# Patient Record
Sex: MALE | Race: WHITE
[De-identification: names, ages, dates, MRNs, and addresses within clinical notes are randomized per-mention and may not be internally consistent; named-entity substitution may affect disease eponyms.]

---

## 2017-05-06 ENCOUNTER — HOSPITAL ENCOUNTER (EMERGENCY)
Dept: HOSPITAL 83 - ED | Age: 67
LOS: 1 days | Discharge: ANOTHER HOSPITAL | End: 2017-05-07
Payer: MEDICARE

## 2017-05-06 VITALS — HEIGHT: 71.97 IN | WEIGHT: 250 LBS | BODY MASS INDEX: 33.86 KG/M2

## 2017-05-06 VITALS — DIASTOLIC BLOOD PRESSURE: 88 MMHG
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
## 2017-05-06 DIAGNOSIS — Z88.0: ICD-10-CM
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
## 2017-05-06 DIAGNOSIS — I60.9: Primary | ICD-10-CM

## 2017-05-06 DIAGNOSIS — Z79.899: ICD-10-CM

## 2017-05-06 LAB
ALBUMIN SERPL-MCNC: NEGATIVE G/DL
AMPHETAMINES UR QL SCN: < 1000
APPEARANCE UR: CLEAR
BARBITURATES UR QL SCN: < 200
BASOPHILS # BLD AUTO: 0.1 10*3/UL (ref 0–0.1)
BASOPHILS NFR BLD AUTO: 0.7 % (ref 0–1)
BILIRUB UR QL STRIP: NEGATIVE
BUN SERPL-MCNC: 13 MG/DL (ref 7–24)
BZE UR QL SCN: < 300
CHLORIDE SERPL-SCNC: 106 MMOL/L (ref 98–107)
CO2 SERPL-SCNC: 25 MMOL/L (ref 21–32)
COLOR UR: YELLOW
EOSINOPHIL # BLD AUTO: 0.3 10*3/UL (ref 0–0.4)
EOSINOPHIL # BLD AUTO: 4.6 % (ref 1–4)
ERYTHROCYTE [DISTWIDTH] IN BLOOD BY AUTOMATED COUNT: 15.6 % (ref 0–14.5)
GLUCOSE SERPL-MCNC: 99 MG/DL (ref 65–99)
GLUCOSE UR QL: NEGATIVE
HCT VFR BLD AUTO: 46.6 % (ref 42–52)
HGB BLD-MCNC: 14.9 G/DL (ref 14–18)
HGB UR QL STRIP: (no result)
IG #: 0 10*3/UL (ref 0–0.1)
INR BLD: 1.1 (ref 2–3.5)
KETONES UR QL STRIP: NEGATIVE
LEUKOCYTE ESTERASE UR QL STRIP: NEGATIVE
LYMPHOCYTES # BLD AUTO: 2.6 10*3/UL (ref 1.3–4.4)
LYMPHOCYTES NFR BLD AUTO: 36.4 % (ref 27–41)
MAGNESIUM SERPL-MCNC: 2.6 MG/DL (ref 1.5–2.1)
MCH RBC QN AUTO: 29.3 PG (ref 27–31)
MCHC RBC AUTO-ENTMCNC: 32 G/DL (ref 33–37)
MCV RBC AUTO: 91.7 FL (ref 80–94)
MONOCYTES # BLD AUTO: 0.8 10*3/UL (ref 0.1–1)
MONOCYTES NFR BLD MANUAL: 11.4 % (ref 3–9)
NEUT #: 3.3 10*3/UL (ref 2.3–7.9)
NEUT %: 46.6 % (ref 47–73)
NITRITE UR QL STRIP: NEGATIVE
NRBC BLD QL AUTO: 0 % (ref 0–0)
PH UR STRIP: 7 [PH] (ref 5–9)
PLATELET # BLD AUTO: 153 10*3/UL (ref 130–400)
PMV BLD AUTO: 10.1 FL (ref 9.6–12.3)
POTASSIUM SERPL-SCNC: 4 MMOL/L (ref 3.5–5.1)
PROTHROMBIN TIME: 11.2 SECONDS (ref 9–12.4)
RBC # BLD AUTO: 5.08 10*6/UL (ref 4.5–5.9)
RBC #/AREA URNS HPF: (no result) RBC/HPF (ref 0–2)
SODIUM SERPL-SCNC: 143 MMOL/L (ref 136–145)
SP GR UR: <= 1.005 (ref 1–1.03)
TROPONIN I SERPL-MCNC: < 0.015 NG/ML (ref ?–0.04)
URINE REFLEX COMMENT: NO
UROBILINOGEN UR STRIP-MCNC: 0.2 E.U./DL (ref 0.2–1)
WBC #/AREA URNS HPF: (no result) WBC/HPF (ref 0–5)
WBC NRBC COR # BLD AUTO: 7 10*3/UL (ref 4.8–10.8)

## 2017-06-06 ENCOUNTER — HOSPITAL ENCOUNTER (INPATIENT)
Dept: HOSPITAL 83 - ED | Age: 67
LOS: 5 days | Discharge: OTHER INSTITUTION | DRG: 682 | End: 2017-06-11
Attending: INTERNAL MEDICINE | Admitting: INTERNAL MEDICINE
Payer: MEDICARE

## 2017-06-06 VITALS — DIASTOLIC BLOOD PRESSURE: 72 MMHG | SYSTOLIC BLOOD PRESSURE: 119 MMHG

## 2017-06-06 VITALS — DIASTOLIC BLOOD PRESSURE: 56 MMHG

## 2017-06-06 VITALS — DIASTOLIC BLOOD PRESSURE: 66 MMHG | SYSTOLIC BLOOD PRESSURE: 106 MMHG

## 2017-06-06 VITALS — BODY MASS INDEX: 28 KG/M2 | HEIGHT: 70.98 IN | WEIGHT: 200 LBS

## 2017-06-06 VITALS — SYSTOLIC BLOOD PRESSURE: 108 MMHG | DIASTOLIC BLOOD PRESSURE: 53 MMHG

## 2017-06-06 VITALS — DIASTOLIC BLOOD PRESSURE: 75 MMHG

## 2017-06-06 VITALS — SYSTOLIC BLOOD PRESSURE: 126 MMHG | DIASTOLIC BLOOD PRESSURE: 55 MMHG

## 2017-06-06 VITALS — SYSTOLIC BLOOD PRESSURE: 118 MMHG | DIASTOLIC BLOOD PRESSURE: 65 MMHG

## 2017-06-06 VITALS — DIASTOLIC BLOOD PRESSURE: 52 MMHG

## 2017-06-06 VITALS — DIASTOLIC BLOOD PRESSURE: 66 MMHG

## 2017-06-06 DIAGNOSIS — I10: ICD-10-CM

## 2017-06-06 DIAGNOSIS — G93.41: ICD-10-CM

## 2017-06-06 DIAGNOSIS — N17.0: Primary | ICD-10-CM

## 2017-06-06 DIAGNOSIS — R13.19: ICD-10-CM

## 2017-06-06 DIAGNOSIS — I95.9: ICD-10-CM

## 2017-06-06 DIAGNOSIS — K29.70: ICD-10-CM

## 2017-06-06 DIAGNOSIS — E44.0: ICD-10-CM

## 2017-06-06 DIAGNOSIS — E86.0: ICD-10-CM

## 2017-06-06 DIAGNOSIS — Z79.899: ICD-10-CM

## 2017-06-06 DIAGNOSIS — E87.6: ICD-10-CM

## 2017-06-06 DIAGNOSIS — Z86.73: ICD-10-CM

## 2017-06-06 DIAGNOSIS — Z88.0: ICD-10-CM

## 2017-06-06 DIAGNOSIS — Z79.1: ICD-10-CM

## 2017-06-06 DIAGNOSIS — Z22.322: ICD-10-CM

## 2017-06-06 DIAGNOSIS — E55.9: ICD-10-CM

## 2017-06-06 DIAGNOSIS — R74.0: ICD-10-CM

## 2017-06-06 DIAGNOSIS — E87.0: ICD-10-CM

## 2017-06-06 DIAGNOSIS — D53.9: ICD-10-CM

## 2017-06-06 LAB
ALBUMIN SERPL-MCNC: (no result) G/DL
ALBUMIN SERPL-MCNC: 3.2 GM/DL (ref 3.1–4.5)
ALP SERPL-CCNC: 116 U/L (ref 45–117)
ALT SERPL W P-5'-P-CCNC: 101 U/L (ref 12–78)
APPEARANCE UR: (no result)
AST SERPL-CCNC: 77 IU/L (ref 3–35)
BACTERIA #/AREA URNS HPF: (no result) /[HPF]
BASOPHILS # BLD AUTO: 0.1 10*3/UL (ref 0–0.1)
BASOPHILS NFR BLD AUTO: 0.7 % (ref 0–1)
BILIRUB UR QL STRIP: NEGATIVE
BUN SERPL-MCNC: 66 MG/DL (ref 7–24)
CASTS URNS QL MICRO: (no result)
CHLORIDE SERPL-SCNC: 117 MMOL/L (ref 98–107)
CK MB SERPL-MCNC: 0.8 NG/ML (ref 0.5–3.6)
CK MB SERPL-MCNC: 1.1 NG/ML (ref 0.5–3.6)
CK SERPL-CCNC: 192 U/L (ref 39–308)
CK SERPL-CCNC: 193 U/L (ref 39–308)
CO2 SERPL-SCNC: 27 MMOL/L (ref 21–32)
COLOR UR: YELLOW
CRP SERPL-MCNC: 1.02 MG/DL (ref 0–0.3)
EOSINOPHIL # BLD AUTO: 0.4 10*3/UL (ref 0–0.4)
EOSINOPHIL # BLD AUTO: 4.2 % (ref 1–4)
EPI CELLS #/AREA URNS HPF: (no result) /[HPF]
ERYTHROCYTE [DISTWIDTH] IN BLOOD BY AUTOMATED COUNT: 14.9 % (ref 0–14.5)
GLUCOSE SERPL-MCNC: 103 MG/DL (ref 65–99)
GLUCOSE UR QL: NEGATIVE
HCT VFR BLD AUTO: 38.6 % (ref 42–52)
HGB BLD-MCNC: 12.1 G/DL (ref 14–18)
HGB UR QL STRIP: NEGATIVE
IG #: 0 10*3/UL (ref 0–0.1)
INR BLD: 1.1 (ref 2–3.5)
KETONES UR QL STRIP: NEGATIVE
LEUKOCYTE ESTERASE UR QL STRIP: NEGATIVE
LYMPHOCYTES # BLD AUTO: 2.6 10*3/UL (ref 1.3–4.4)
LYMPHOCYTES NFR BLD AUTO: 29.3 % (ref 27–41)
MAGNESIUM SERPL-MCNC: 2.9 MG/DL (ref 1.5–2.1)
MCH RBC QN AUTO: 29.8 PG (ref 27–31)
MCHC RBC AUTO-ENTMCNC: 31.3 G/DL (ref 33–37)
MCV RBC AUTO: 95.1 FL (ref 80–94)
MONOCYTES # BLD AUTO: 0.7 10*3/UL (ref 0.1–1)
MONOCYTES NFR BLD MANUAL: 7.9 % (ref 3–9)
MUCOUS THREADS URNS QL MICRO: (no result)
MYOGLOBIN SERPL-MCNC: 325 NG/ML (ref 16–116)
NEUT #: 5.2 10*3/UL (ref 2.3–7.9)
NEUT %: 57.6 % (ref 47–73)
NITRITE UR QL STRIP: NEGATIVE
NRBC BLD QL AUTO: 0 % (ref 0–0)
PH UR STRIP: 5.5 [PH] (ref 5–9)
PLATELET # BLD AUTO: 183 10*3/UL (ref 130–400)
PMV BLD AUTO: 12.8 FL (ref 9.6–12.3)
POTASSIUM SERPL-SCNC: 4.3 MMOL/L (ref 3.5–5.1)
PROT SERPL-MCNC: 8.7 GM/DL (ref 6.4–8.2)
PROTHROMBIN TIME: 12 SECONDS (ref 9–12.4)
RBC # BLD AUTO: 4.06 10*6/UL (ref 4.5–5.9)
SODIUM SERPL-SCNC: 154 MMOL/L (ref 136–145)
SP GR UR: 1.02 (ref 1–1.03)
TROPONIN I SERPL-MCNC: < 0.015 NG/ML (ref ?–0.04)
TROPONIN I SERPL-MCNC: < 0.015 NG/ML (ref ?–0.04)
URINE REFLEX COMMENT: NO
UROBILINOGEN UR STRIP-MCNC: 0.2 E.U./DL (ref 0.2–1)
WBC #/AREA URNS HPF: (no result) WBC/HPF (ref 0–5)
WBC NRBC COR # BLD AUTO: 9 10*3/UL (ref 4.8–10.8)

## 2017-06-07 VITALS — SYSTOLIC BLOOD PRESSURE: 126 MMHG | DIASTOLIC BLOOD PRESSURE: 63 MMHG

## 2017-06-07 VITALS — DIASTOLIC BLOOD PRESSURE: 51 MMHG | SYSTOLIC BLOOD PRESSURE: 127 MMHG

## 2017-06-07 VITALS — DIASTOLIC BLOOD PRESSURE: 72 MMHG

## 2017-06-07 VITALS — DIASTOLIC BLOOD PRESSURE: 70 MMHG | SYSTOLIC BLOOD PRESSURE: 145 MMHG

## 2017-06-07 VITALS — DIASTOLIC BLOOD PRESSURE: 73 MMHG

## 2017-06-07 LAB
25(OH)D3 SERPL-MCNC: 22.5 NG/ML (ref 30–100)
ALBUMIN SERPL-MCNC: 3 GM/DL (ref 3.1–4.5)
ALP SERPL-CCNC: 103 U/L (ref 45–117)
ALT SERPL W P-5'-P-CCNC: 93 U/L (ref 12–78)
AST SERPL-CCNC: 72 IU/L (ref 3–35)
BASOPHILS # BLD AUTO: 0 10*3/UL (ref 0–0.1)
BASOPHILS NFR BLD AUTO: 0.4 % (ref 0–1)
BUN SERPL-MCNC: 43 MG/DL (ref 7–24)
CHLORIDE SERPL-SCNC: 111 MMOL/L (ref 98–107)
CHOLEST SERPL-MCNC: 105 MG/DL (ref ?–200)
CK MB SERPL-MCNC: 0.8 NG/ML (ref 0.5–3.6)
CK MB SERPL-MCNC: 1.1 NG/ML (ref 0.5–3.6)
CK SERPL-CCNC: 205 U/L (ref 39–308)
CK SERPL-CCNC: 207 U/L (ref 39–308)
CO2 SERPL-SCNC: 28 MMOL/L (ref 21–32)
EOSINOPHIL # BLD AUTO: 0.4 10*3/UL (ref 0–0.4)
EOSINOPHIL # BLD AUTO: 4.6 % (ref 1–4)
ERYTHROCYTE [DISTWIDTH] IN BLOOD BY AUTOMATED COUNT: 14.6 % (ref 0–14.5)
FOLATE SERPL-MCNC: 18.39 NG/ML (ref 5.38–?)
GLUCOSE SERPL-MCNC: 105 MG/DL (ref 65–99)
HCT VFR BLD AUTO: 35.3 % (ref 42–52)
HDLC SERPL-MCNC: 31 MG/DL (ref 40–60)
HGB BLD-MCNC: 10.9 G/DL (ref 14–18)
IG #: 0 10*3/UL (ref 0–0.1)
INR BLD: 1.1 (ref 2–3.5)
LDLC SERPL DIRECT ASSAY-MCNC: 55 MG/DL (ref 9–159)
LYMPHOCYTES # BLD AUTO: 2.4 10*3/UL (ref 1.3–4.4)
LYMPHOCYTES NFR BLD AUTO: 28.7 % (ref 27–41)
MAGNESIUM SERPL-MCNC: 2.5 MG/DL (ref 1.5–2.1)
MCH RBC QN AUTO: 29.1 PG (ref 27–31)
MCHC RBC AUTO-ENTMCNC: 30.9 G/DL (ref 33–37)
MCV RBC AUTO: 94.4 FL (ref 80–94)
MONOCYTES # BLD AUTO: 0.8 10*3/UL (ref 0.1–1)
MONOCYTES NFR BLD MANUAL: 9.4 % (ref 3–9)
NEUT #: 4.8 10*3/UL (ref 2.3–7.9)
NEUT %: 56.7 % (ref 47–73)
NRBC BLD QL AUTO: 0 % (ref 0–0)
PHOSPHATE SERPL-MCNC: 3.1 MG/DL (ref 2.5–4.9)
PLATELET # BLD AUTO: 155 10*3/UL (ref 130–400)
PMV BLD AUTO: 12.2 FL (ref 9.6–12.3)
POTASSIUM SERPL-SCNC: 3.6 MMOL/L (ref 3.5–5.1)
PROT SERPL-MCNC: 8 GM/DL (ref 6.4–8.2)
PROTHROMBIN TIME: 12.2 SECONDS (ref 9–12.4)
RBC # BLD AUTO: 3.74 10*6/UL (ref 4.5–5.9)
SODIUM SERPL-SCNC: 148 MMOL/L (ref 136–145)
T4 FREE SERPL-MCNC: 1.2 NG/DL (ref 0.76–1.46)
TRIGL SERPL-MCNC: 94 MG/DL (ref ?–150)
TROPONIN I SERPL-MCNC: < 0.015 NG/ML (ref ?–0.04)
TROPONIN I SERPL-MCNC: < 0.015 NG/ML (ref ?–0.04)
TSH SERPL DL<=0.005 MIU/L-ACNC: 2.47 UIU/ML (ref 0.36–4.75)
VITAMIN B12: 773 PG/ML (ref 247–911)
VLDLC SERPL CALC-MCNC: 19 MG/DL (ref 6–40)
WBC NRBC COR # BLD AUTO: 8.4 10*3/UL (ref 4.8–10.8)

## 2017-06-08 VITALS — SYSTOLIC BLOOD PRESSURE: 138 MMHG | DIASTOLIC BLOOD PRESSURE: 74 MMHG

## 2017-06-08 VITALS — DIASTOLIC BLOOD PRESSURE: 72 MMHG

## 2017-06-08 VITALS — DIASTOLIC BLOOD PRESSURE: 68 MMHG

## 2017-06-08 VITALS — DIASTOLIC BLOOD PRESSURE: 69 MMHG

## 2017-06-08 LAB
ALBUMIN SERPL-MCNC: 2.9 GM/DL (ref 3.1–4.5)
ALP SERPL-CCNC: 106 U/L (ref 45–117)
ALT SERPL W P-5'-P-CCNC: 89 U/L (ref 12–78)
AST SERPL-CCNC: 75 IU/L (ref 3–35)
BASOPHILS # BLD AUTO: 0 10*3/UL (ref 0–0.1)
BASOPHILS NFR BLD AUTO: 0.3 % (ref 0–1)
BUN SERPL-MCNC: 26 MG/DL (ref 7–24)
CHLORIDE SERPL-SCNC: 105 MMOL/L (ref 98–107)
CO2 SERPL-SCNC: 27 MMOL/L (ref 21–32)
EOSINOPHIL # BLD AUTO: 0.3 10*3/UL (ref 0–0.4)
EOSINOPHIL # BLD AUTO: 3.6 % (ref 1–4)
ERYTHROCYTE [DISTWIDTH] IN BLOOD BY AUTOMATED COUNT: 14.2 % (ref 0–14.5)
GLUCOSE SERPL-MCNC: 98 MG/DL (ref 65–99)
HCT VFR BLD AUTO: 35.4 % (ref 42–52)
HGB BLD-MCNC: 11.1 G/DL (ref 14–18)
IG #: 0 10*3/UL (ref 0–0.1)
LYMPHOCYTES # BLD AUTO: 2.5 10*3/UL (ref 1.3–4.4)
LYMPHOCYTES NFR BLD AUTO: 32.1 % (ref 27–41)
MCH RBC QN AUTO: 29.1 PG (ref 27–31)
MCHC RBC AUTO-ENTMCNC: 31.4 G/DL (ref 33–37)
MCV RBC AUTO: 92.7 FL (ref 80–94)
MONOCYTES # BLD AUTO: 0.8 10*3/UL (ref 0.1–1)
MONOCYTES NFR BLD MANUAL: 9.8 % (ref 3–9)
NEUT #: 4.2 10*3/UL (ref 2.3–7.9)
NEUT %: 53.8 % (ref 47–73)
NRBC BLD QL AUTO: 0 % (ref 0–0)
PLATELET # BLD AUTO: 138 10*3/UL (ref 130–400)
PMV BLD AUTO: 12.5 FL (ref 9.6–12.3)
POTASSIUM SERPL-SCNC: 3.4 MMOL/L (ref 3.5–5.1)
PROT SERPL-MCNC: 7.9 GM/DL (ref 6.4–8.2)
RBC # BLD AUTO: 3.82 10*6/UL (ref 4.5–5.9)
SODIUM SERPL-SCNC: 140 MMOL/L (ref 136–145)
WBC NRBC COR # BLD AUTO: 7.9 10*3/UL (ref 4.8–10.8)

## 2017-06-09 VITALS — DIASTOLIC BLOOD PRESSURE: 61 MMHG | SYSTOLIC BLOOD PRESSURE: 129 MMHG

## 2017-06-09 VITALS — DIASTOLIC BLOOD PRESSURE: 82 MMHG | SYSTOLIC BLOOD PRESSURE: 158 MMHG

## 2017-06-09 VITALS — DIASTOLIC BLOOD PRESSURE: 87 MMHG | SYSTOLIC BLOOD PRESSURE: 128 MMHG

## 2017-06-09 VITALS — DIASTOLIC BLOOD PRESSURE: 81 MMHG

## 2017-06-09 VITALS — SYSTOLIC BLOOD PRESSURE: 161 MMHG | DIASTOLIC BLOOD PRESSURE: 71 MMHG

## 2017-06-09 VITALS — DIASTOLIC BLOOD PRESSURE: 78 MMHG

## 2017-06-09 VITALS — SYSTOLIC BLOOD PRESSURE: 154 MMHG | DIASTOLIC BLOOD PRESSURE: 70 MMHG

## 2017-06-09 VITALS — DIASTOLIC BLOOD PRESSURE: 71 MMHG

## 2017-06-09 LAB
ALBUMIN SERPL-MCNC: 3 GM/DL (ref 3.1–4.5)
ALP SERPL-CCNC: 106 U/L (ref 45–117)
ALT SERPL W P-5'-P-CCNC: 95 U/L (ref 12–78)
AST SERPL-CCNC: 79 IU/L (ref 3–35)
BASOPHILS # BLD AUTO: 0 10*3/UL (ref 0–0.1)
BASOPHILS NFR BLD AUTO: 0.3 % (ref 0–1)
BUN SERPL-MCNC: 12 MG/DL (ref 7–24)
CHLORIDE SERPL-SCNC: 102 MMOL/L (ref 98–107)
CO2 SERPL-SCNC: 28 MMOL/L (ref 21–32)
EOSINOPHIL # BLD AUTO: 0.2 10*3/UL (ref 0–0.4)
EOSINOPHIL # BLD AUTO: 3.1 % (ref 1–4)
ERYTHROCYTE [DISTWIDTH] IN BLOOD BY AUTOMATED COUNT: 13.8 % (ref 0–14.5)
GLUCOSE SERPL-MCNC: 93 MG/DL (ref 65–99)
HCT VFR BLD AUTO: 34.8 % (ref 42–52)
HGB BLD-MCNC: 11.3 G/DL (ref 14–18)
IG #: 0 10*3/UL (ref 0–0.1)
LYMPHOCYTES # BLD AUTO: 1.9 10*3/UL (ref 1.3–4.4)
LYMPHOCYTES NFR BLD AUTO: 25.3 % (ref 27–41)
MCH RBC QN AUTO: 30.3 PG (ref 27–31)
MCHC RBC AUTO-ENTMCNC: 32.5 G/DL (ref 33–37)
MCV RBC AUTO: 93.3 FL (ref 80–94)
MONOCYTES # BLD AUTO: 0.9 10*3/UL (ref 0.1–1)
MONOCYTES NFR BLD MANUAL: 11.8 % (ref 3–9)
NEUT #: 4.4 10*3/UL (ref 2.3–7.9)
NEUT %: 59.4 % (ref 47–73)
NRBC BLD QL AUTO: 0 10*3/UL (ref 0–0)
PLATELET # BLD AUTO: 142 10*3/UL (ref 130–400)
PMV BLD AUTO: 12.4 FL (ref 9.6–12.3)
POTASSIUM SERPL-SCNC: 3.4 MMOL/L (ref 3.5–5.1)
PROT SERPL-MCNC: 8 GM/DL (ref 6.4–8.2)
RBC # BLD AUTO: 3.73 10*6/UL (ref 4.5–5.9)
SODIUM SERPL-SCNC: 138 MMOL/L (ref 136–145)
WBC NRBC COR # BLD AUTO: 7.4 10*3/UL (ref 4.8–10.8)

## 2017-06-09 PROCEDURE — 0DH63UZ INSERTION OF FEEDING DEVICE INTO STOMACH, PERCUTANEOUS APPROACH: ICD-10-PCS | Performed by: SURGERY

## 2017-06-10 VITALS — SYSTOLIC BLOOD PRESSURE: 131 MMHG | DIASTOLIC BLOOD PRESSURE: 55 MMHG

## 2017-06-10 VITALS — SYSTOLIC BLOOD PRESSURE: 127 MMHG | DIASTOLIC BLOOD PRESSURE: 56 MMHG

## 2017-06-10 VITALS — SYSTOLIC BLOOD PRESSURE: 152 MMHG | DIASTOLIC BLOOD PRESSURE: 77 MMHG

## 2017-06-10 VITALS — SYSTOLIC BLOOD PRESSURE: 89 MMHG | DIASTOLIC BLOOD PRESSURE: 49 MMHG

## 2017-06-10 VITALS — DIASTOLIC BLOOD PRESSURE: 58 MMHG

## 2017-06-10 VITALS — DIASTOLIC BLOOD PRESSURE: 78 MMHG

## 2017-06-10 VITALS — DIASTOLIC BLOOD PRESSURE: 50 MMHG

## 2017-06-10 LAB
ALBUMIN SERPL-MCNC: 3 GM/DL (ref 3.1–4.5)
ALP SERPL-CCNC: 116 U/L (ref 45–117)
ALT SERPL W P-5'-P-CCNC: 88 U/L (ref 12–78)
AST SERPL-CCNC: 69 IU/L (ref 3–35)
BASOPHILS # BLD AUTO: 0 10*3/UL (ref 0–0.1)
BASOPHILS NFR BLD AUTO: 0.2 % (ref 0–1)
BUN SERPL-MCNC: 8 MG/DL (ref 7–24)
CHLORIDE SERPL-SCNC: 102 MMOL/L (ref 98–107)
CO2 SERPL-SCNC: 24 MMOL/L (ref 21–32)
EOSINOPHIL # BLD AUTO: 0.1 10*3/UL (ref 0–0.4)
EOSINOPHIL # BLD AUTO: 1.4 % (ref 1–4)
ERYTHROCYTE [DISTWIDTH] IN BLOOD BY AUTOMATED COUNT: 14 % (ref 0–14.5)
GLUCOSE SERPL-MCNC: 133 MG/DL (ref 65–99)
HCT VFR BLD AUTO: 35.6 % (ref 42–52)
HGB BLD-MCNC: 11.7 G/DL (ref 14–18)
IG #: 0 10*3/UL (ref 0–0.1)
LYMPHOCYTES # BLD AUTO: 1.9 10*3/UL (ref 1.3–4.4)
LYMPHOCYTES NFR BLD AUTO: 20.4 % (ref 27–41)
MAGNESIUM SERPL-MCNC: 1.8 MG/DL (ref 1.5–2.1)
MCH RBC QN AUTO: 29.9 PG (ref 27–31)
MCHC RBC AUTO-ENTMCNC: 32.9 G/DL (ref 33–37)
MCV RBC AUTO: 91 FL (ref 80–94)
MONOCYTES # BLD AUTO: 1.1 10*3/UL (ref 0.1–1)
MONOCYTES NFR BLD MANUAL: 11.6 % (ref 3–9)
NEUT #: 6 10*3/UL (ref 2.3–7.9)
NEUT %: 66.2 % (ref 47–73)
NRBC BLD QL AUTO: 0 % (ref 0–0)
PHOSPHATE SERPL-MCNC: 2.4 MG/DL (ref 2.5–4.9)
PLATELET # BLD AUTO: 142 10*3/UL (ref 130–400)
PMV BLD AUTO: 12 FL (ref 9.6–12.3)
POTASSIUM SERPL-SCNC: 3.3 MMOL/L (ref 3.5–5.1)
PROT SERPL-MCNC: 8 GM/DL (ref 6.4–8.2)
RBC # BLD AUTO: 3.91 10*6/UL (ref 4.5–5.9)
SODIUM SERPL-SCNC: 136 MMOL/L (ref 136–145)
WBC NRBC COR # BLD AUTO: 9.1 10*3/UL (ref 4.8–10.8)

## 2017-06-11 VITALS — DIASTOLIC BLOOD PRESSURE: 63 MMHG

## 2017-06-11 VITALS — DIASTOLIC BLOOD PRESSURE: 67 MMHG | SYSTOLIC BLOOD PRESSURE: 140 MMHG

## 2017-06-11 VITALS — DIASTOLIC BLOOD PRESSURE: 67 MMHG

## 2017-06-11 LAB
ALBUMIN SERPL-MCNC: 2.8 GM/DL (ref 3.1–4.5)
ALP SERPL-CCNC: 115 U/L (ref 45–117)
ALT SERPL W P-5'-P-CCNC: 74 U/L (ref 12–78)
AST SERPL-CCNC: 55 IU/L (ref 3–35)
BUN SERPL-MCNC: 9 MG/DL (ref 7–24)
CHLORIDE SERPL-SCNC: 105 MMOL/L (ref 98–107)
CO2 SERPL-SCNC: 25 MMOL/L (ref 21–32)
GLUCOSE SERPL-MCNC: 119 MG/DL (ref 65–99)
MAGNESIUM SERPL-MCNC: 1.8 MG/DL (ref 1.5–2.1)
PHOSPHATE SERPL-MCNC: 2.5 MG/DL (ref 2.5–4.9)
POTASSIUM SERPL-SCNC: 3.8 MMOL/L (ref 3.5–5.1)
PROT SERPL-MCNC: 7.5 GM/DL (ref 6.4–8.2)
SODIUM SERPL-SCNC: 139 MMOL/L (ref 136–145)

## 2017-06-24 ENCOUNTER — HOSPITAL ENCOUNTER (INPATIENT)
Dept: HOSPITAL 83 - ED | Age: 67
LOS: 3 days | Discharge: OTHER INSTITUTION | DRG: 871 | End: 2017-06-27
Attending: INTERNAL MEDICINE | Admitting: INTERNAL MEDICINE
Payer: MEDICARE

## 2017-06-24 VITALS — SYSTOLIC BLOOD PRESSURE: 109 MMHG | DIASTOLIC BLOOD PRESSURE: 57 MMHG

## 2017-06-24 VITALS
HEIGHT: 70 IN | DIASTOLIC BLOOD PRESSURE: 59 MMHG | BODY MASS INDEX: 29.92 KG/M2 | SYSTOLIC BLOOD PRESSURE: 120 MMHG | WEIGHT: 209 LBS

## 2017-06-24 VITALS — DIASTOLIC BLOOD PRESSURE: 73 MMHG | SYSTOLIC BLOOD PRESSURE: 127 MMHG

## 2017-06-24 DIAGNOSIS — E86.0: ICD-10-CM

## 2017-06-24 DIAGNOSIS — I11.9: ICD-10-CM

## 2017-06-24 DIAGNOSIS — E55.9: ICD-10-CM

## 2017-06-24 DIAGNOSIS — Z86.14: ICD-10-CM

## 2017-06-24 DIAGNOSIS — Z79.899: ICD-10-CM

## 2017-06-24 DIAGNOSIS — D69.6: ICD-10-CM

## 2017-06-24 DIAGNOSIS — Z86.73: ICD-10-CM

## 2017-06-24 DIAGNOSIS — R13.19: ICD-10-CM

## 2017-06-24 DIAGNOSIS — D64.9: ICD-10-CM

## 2017-06-24 DIAGNOSIS — E87.8: ICD-10-CM

## 2017-06-24 DIAGNOSIS — Z79.1: ICD-10-CM

## 2017-06-24 DIAGNOSIS — K94.22: ICD-10-CM

## 2017-06-24 DIAGNOSIS — G93.41: ICD-10-CM

## 2017-06-24 DIAGNOSIS — Z88.0: ICD-10-CM

## 2017-06-24 DIAGNOSIS — E43: ICD-10-CM

## 2017-06-24 DIAGNOSIS — Z87.891: ICD-10-CM

## 2017-06-24 DIAGNOSIS — A41.9: Primary | ICD-10-CM

## 2017-06-24 LAB
ALBUMIN SERPL-MCNC: (no result) G/DL
ALBUMIN SERPL-MCNC: 3.1 GM/DL (ref 3.1–4.5)
ALP SERPL-CCNC: 80 U/L (ref 45–117)
ALT SERPL W P-5'-P-CCNC: 39 U/L (ref 12–78)
APPEARANCE UR: (no result)
AST SERPL-CCNC: 31 IU/L (ref 3–35)
BACTERIA #/AREA URNS HPF: (no result) /[HPF]
BASOPHILS # BLD AUTO: 0 10*3/UL (ref 0–0.1)
BASOPHILS NFR BLD AUTO: 0.3 % (ref 0–1)
BILIRUB UR QL STRIP: (no result)
BUN SERPL-MCNC: 18 MG/DL (ref 7–24)
CHLORIDE SERPL-SCNC: 110 MMOL/L (ref 98–107)
CO2 SERPL-SCNC: 24 MMOL/L (ref 21–32)
COLOR UR: YELLOW
EOSINOPHIL # BLD AUTO: 0.1 10*3/UL (ref 0–0.4)
EOSINOPHIL # BLD AUTO: 0.5 % (ref 1–4)
EPI CELLS #/AREA URNS HPF: (no result) /[HPF]
ERYTHROCYTE [DISTWIDTH] IN BLOOD BY AUTOMATED COUNT: 15.1 % (ref 0–14.5)
GLUCOSE SERPL-MCNC: 108 MG/DL (ref 65–99)
GLUCOSE UR QL: NEGATIVE
HCT VFR BLD AUTO: 32.8 % (ref 42–52)
HGB BLD-MCNC: 10.5 G/DL (ref 14–18)
HGB UR QL STRIP: (no result)
IG #: 0 10*3/UL (ref 0–0.1)
KETONES UR QL STRIP: (no result)
LEUKOCYTE ESTERASE UR QL STRIP: NEGATIVE
LYMPHOCYTES # BLD AUTO: 1.9 10*3/UL (ref 1.3–4.4)
LYMPHOCYTES NFR BLD AUTO: 17.6 % (ref 27–41)
MCH RBC QN AUTO: 29.6 PG (ref 27–31)
MCHC RBC AUTO-ENTMCNC: 32 G/DL (ref 33–37)
MCV RBC AUTO: 92.4 FL (ref 80–94)
MONOCYTES # BLD AUTO: 1.3 10*3/UL (ref 0.1–1)
MONOCYTES NFR BLD MANUAL: 12.4 % (ref 3–9)
MUCOUS THREADS URNS QL MICRO: (no result)
NEUT #: 7.3 10*3/UL (ref 2.3–7.9)
NEUT %: 68.8 % (ref 47–73)
NITRITE UR QL STRIP: NEGATIVE
NRBC BLD QL AUTO: 0 10*3/UL (ref 0–0)
PH UR STRIP: 5.5 [PH] (ref 5–9)
PLATELET # BLD AUTO: 155 10*3/UL (ref 130–400)
PMV BLD AUTO: 11 FL (ref 9.6–12.3)
POTASSIUM SERPL-SCNC: 4.1 MMOL/L (ref 3.5–5.1)
PROT SERPL-MCNC: 7.7 GM/DL (ref 6.4–8.2)
RBC # BLD AUTO: 3.55 10*6/UL (ref 4.5–5.9)
RBC #/AREA URNS HPF: (no result) RBC/HPF (ref 0–2)
SODIUM SERPL-SCNC: 144 MMOL/L (ref 136–145)
SP GR UR: 1.01 (ref 1–1.03)
URINE REFLEX COMMENT: YES
UROBILINOGEN UR STRIP-MCNC: 1 E.U./DL (ref 0.2–1)
WBC #/AREA URNS HPF: (no result) WBC/HPF (ref 0–5)
WBC NRBC COR # BLD AUTO: 10.5 10*3/UL (ref 4.8–10.8)

## 2017-06-25 VITALS — DIASTOLIC BLOOD PRESSURE: 65 MMHG

## 2017-06-25 VITALS — SYSTOLIC BLOOD PRESSURE: 121 MMHG | DIASTOLIC BLOOD PRESSURE: 67 MMHG

## 2017-06-25 VITALS — SYSTOLIC BLOOD PRESSURE: 156 MMHG | DIASTOLIC BLOOD PRESSURE: 68 MMHG

## 2017-06-25 VITALS — DIASTOLIC BLOOD PRESSURE: 58 MMHG | SYSTOLIC BLOOD PRESSURE: 131 MMHG

## 2017-06-25 VITALS — DIASTOLIC BLOOD PRESSURE: 63 MMHG | SYSTOLIC BLOOD PRESSURE: 153 MMHG

## 2017-06-25 LAB
25(OH)D3 SERPL-MCNC: 27.3 NG/ML (ref 30–100)
ALBUMIN SERPL-MCNC: 2.5 GM/DL (ref 3.1–4.5)
ALP SERPL-CCNC: 66 U/L (ref 45–117)
ALT SERPL W P-5'-P-CCNC: 29 U/L (ref 12–78)
AST SERPL-CCNC: 27 IU/L (ref 3–35)
BASOPHILS # BLD AUTO: 0 10*3/UL (ref 0–0.1)
BASOPHILS NFR BLD AUTO: 0.2 % (ref 0–1)
BUN SERPL-MCNC: 14 MG/DL (ref 7–24)
CHLORIDE SERPL-SCNC: 111 MMOL/L (ref 98–107)
CHOLEST SERPL-MCNC: 79 MG/DL (ref ?–200)
CK MB SERPL-MCNC: < 0.5 NG/ML (ref 0.5–3.6)
CK MB SERPL-MCNC: < 0.5 NG/ML (ref 0.5–3.6)
CK SERPL-CCNC: 126 U/L (ref 39–308)
CK SERPL-CCNC: 173 U/L (ref 39–308)
CO2 SERPL-SCNC: 24 MMOL/L (ref 21–32)
EOSINOPHIL # BLD AUTO: 0.1 10*3/UL (ref 0–0.4)
EOSINOPHIL # BLD AUTO: 0.7 % (ref 1–4)
ERYTHROCYTE [DISTWIDTH] IN BLOOD BY AUTOMATED COUNT: 14.8 % (ref 0–14.5)
EST. AVERAGE GLUCOSE BLD GHB EST-MCNC: 131 MG/DL
FOLATE SERPL-MCNC: 23.86 NG/ML (ref 5.38–?)
GLUCOSE SERPL-MCNC: 87 MG/DL (ref 65–99)
HCT VFR BLD AUTO: 29.4 % (ref 42–52)
HDLC SERPL-MCNC: 26 MG/DL (ref 40–60)
HGB BLD-MCNC: 9.5 G/DL (ref 14–18)
IG #: 0 10*3/UL (ref 0–0.1)
INR BLD: 1.3 (ref 2–3.5)
LDLC SERPL DIRECT ASSAY-MCNC: 40 MG/DL (ref 9–159)
LYMPHOCYTES # BLD AUTO: 1.6 10*3/UL (ref 1.3–4.4)
LYMPHOCYTES NFR BLD AUTO: 19.8 % (ref 27–41)
MAGNESIUM SERPL-MCNC: 2 MG/DL (ref 1.5–2.1)
MCH RBC QN AUTO: 30.1 PG (ref 27–31)
MCHC RBC AUTO-ENTMCNC: 32.3 G/DL (ref 33–37)
MCV RBC AUTO: 93 FL (ref 80–94)
MONOCYTES # BLD AUTO: 1 10*3/UL (ref 0.1–1)
MONOCYTES NFR BLD MANUAL: 11.8 % (ref 3–9)
NEUT #: 5.4 10*3/UL (ref 2.3–7.9)
NEUT %: 67.3 % (ref 47–73)
NRBC BLD QL AUTO: 0 10*3/UL (ref 0–0)
PHOSPHATE SERPL-MCNC: 3.1 MG/DL (ref 2.5–4.9)
PLATELET # BLD AUTO: 123 10*3/UL (ref 130–400)
PMV BLD AUTO: 10.2 FL (ref 9.6–12.3)
POTASSIUM SERPL-SCNC: 3.8 MMOL/L (ref 3.5–5.1)
PROT SERPL-MCNC: 6.6 GM/DL (ref 6.4–8.2)
PROTHROMBIN TIME: 13.5 SECONDS (ref 9–12.4)
RBC # BLD AUTO: 3.16 10*6/UL (ref 4.5–5.9)
SODIUM SERPL-SCNC: 143 MMOL/L (ref 136–145)
T4 FREE SERPL-MCNC: 1.22 NG/DL (ref 0.76–1.46)
TRIGL SERPL-MCNC: 66 MG/DL (ref ?–150)
TSH SERPL DL<=0.005 MIU/L-ACNC: 1.64 UIU/ML (ref 0.36–4.75)
VITAMIN B12: 611 PG/ML (ref 247–911)
VLDLC SERPL CALC-MCNC: 13 MG/DL (ref 6–40)
WBC NRBC COR # BLD AUTO: 8.1 10*3/UL (ref 4.8–10.8)

## 2017-06-26 VITALS — DIASTOLIC BLOOD PRESSURE: 56 MMHG | SYSTOLIC BLOOD PRESSURE: 132 MMHG

## 2017-06-26 VITALS — DIASTOLIC BLOOD PRESSURE: 70 MMHG

## 2017-06-26 VITALS — SYSTOLIC BLOOD PRESSURE: 120 MMHG | DIASTOLIC BLOOD PRESSURE: 54 MMHG

## 2017-06-26 VITALS — DIASTOLIC BLOOD PRESSURE: 60 MMHG | SYSTOLIC BLOOD PRESSURE: 140 MMHG

## 2017-06-26 LAB
BUN SERPL-MCNC: 8 MG/DL (ref 7–24)
CHLORIDE SERPL-SCNC: 107 MMOL/L (ref 98–107)
CO2 SERPL-SCNC: 24 MMOL/L (ref 21–32)
GLUCOSE SERPL-MCNC: 94 MG/DL (ref 65–99)
POTASSIUM SERPL-SCNC: 3.2 MMOL/L (ref 3.5–5.1)
SODIUM SERPL-SCNC: 141 MMOL/L (ref 136–145)

## 2017-06-26 PROCEDURE — BD11YZZ FLUOROSCOPY OF ESOPHAGUS USING OTHER CONTRAST: ICD-10-PCS

## 2017-06-27 VITALS — DIASTOLIC BLOOD PRESSURE: 68 MMHG

## 2017-06-27 VITALS — DIASTOLIC BLOOD PRESSURE: 81 MMHG

## 2017-06-27 LAB
BASOPHILS # BLD AUTO: 0 10*3/UL (ref 0–0.1)
BASOPHILS NFR BLD AUTO: 0.4 % (ref 0–1)
BUN SERPL-MCNC: 6 MG/DL (ref 7–24)
CHLORIDE SERPL-SCNC: 107 MMOL/L (ref 98–107)
CO2 SERPL-SCNC: 28 MMOL/L (ref 21–32)
EOSINOPHIL # BLD AUTO: 0.2 10*3/UL (ref 0–0.4)
EOSINOPHIL # BLD AUTO: 3.4 % (ref 1–4)
ERYTHROCYTE [DISTWIDTH] IN BLOOD BY AUTOMATED COUNT: 14.5 % (ref 0–14.5)
GLUCOSE SERPL-MCNC: 97 MG/DL (ref 65–99)
HCT VFR BLD AUTO: 30.7 % (ref 42–52)
HGB BLD-MCNC: 9.8 G/DL (ref 14–18)
IG #: 0 10*3/UL (ref 0–0.1)
LYMPHOCYTES # BLD AUTO: 1.3 10*3/UL (ref 1.3–4.4)
LYMPHOCYTES NFR BLD AUTO: 28.9 % (ref 27–41)
MCH RBC QN AUTO: 29.5 PG (ref 27–31)
MCHC RBC AUTO-ENTMCNC: 31.9 G/DL (ref 33–37)
MCV RBC AUTO: 92.5 FL (ref 80–94)
MONOCYTES # BLD AUTO: 0.5 10*3/UL (ref 0.1–1)
MONOCYTES NFR BLD MANUAL: 10.1 % (ref 3–9)
NEUT #: 2.6 10*3/UL (ref 2.3–7.9)
NEUT %: 57 % (ref 47–73)
NRBC BLD QL AUTO: 0 % (ref 0–0)
PLATELET # BLD AUTO: 131 10*3/UL (ref 130–400)
PMV BLD AUTO: 10.6 FL (ref 9.6–12.3)
POTASSIUM SERPL-SCNC: 3.5 MMOL/L (ref 3.5–5.1)
RBC # BLD AUTO: 3.32 10*6/UL (ref 4.5–5.9)
SODIUM SERPL-SCNC: 143 MMOL/L (ref 136–145)
WBC NRBC COR # BLD AUTO: 4.6 10*3/UL (ref 4.8–10.8)

## 2017-09-15 ENCOUNTER — HOSPITAL ENCOUNTER (EMERGENCY)
Dept: HOSPITAL 83 - ED | Age: 67
Discharge: HOME | End: 2017-09-15
Payer: SELF-PAY

## 2017-09-15 VITALS — DIASTOLIC BLOOD PRESSURE: 81 MMHG

## 2017-09-15 VITALS — HEIGHT: 60 IN

## 2017-09-15 DIAGNOSIS — Z87.891: ICD-10-CM

## 2017-09-15 DIAGNOSIS — Z79.899: ICD-10-CM

## 2017-09-15 DIAGNOSIS — Z76.0: Primary | ICD-10-CM

## 2017-09-15 DIAGNOSIS — Z88.0: ICD-10-CM

## 2017-09-26 ENCOUNTER — HOSPITAL ENCOUNTER (OUTPATIENT)
Dept: HOSPITAL 83 - RESCLI | Age: 67
Discharge: HOME | End: 2017-09-26
Attending: INTERNAL MEDICINE
Payer: MEDICAID

## 2017-09-26 DIAGNOSIS — Z76.89: ICD-10-CM

## 2017-09-26 DIAGNOSIS — K59.00: ICD-10-CM

## 2017-09-26 DIAGNOSIS — G40.909: ICD-10-CM

## 2017-09-26 DIAGNOSIS — R06.02: ICD-10-CM

## 2017-09-26 DIAGNOSIS — I69.398: ICD-10-CM

## 2017-09-26 DIAGNOSIS — F10.10: ICD-10-CM

## 2017-09-26 DIAGNOSIS — I10: Primary | ICD-10-CM

## 2018-01-31 ENCOUNTER — HOSPITAL ENCOUNTER (OUTPATIENT)
Dept: HOSPITAL 83 - RESCLI | Age: 68
Discharge: HOME | End: 2018-01-31
Attending: INTERNAL MEDICINE
Payer: MEDICARE

## 2018-01-31 DIAGNOSIS — G40.909: ICD-10-CM

## 2018-01-31 DIAGNOSIS — N40.0: ICD-10-CM

## 2018-01-31 DIAGNOSIS — R06.02: ICD-10-CM

## 2018-01-31 DIAGNOSIS — R68.2: ICD-10-CM

## 2018-01-31 DIAGNOSIS — F10.10: ICD-10-CM

## 2018-01-31 DIAGNOSIS — F43.20: ICD-10-CM

## 2018-01-31 DIAGNOSIS — Z76.89: Primary | ICD-10-CM

## 2018-01-31 DIAGNOSIS — E66.9: ICD-10-CM

## 2018-01-31 DIAGNOSIS — I63.8: ICD-10-CM

## 2018-01-31 DIAGNOSIS — I10: ICD-10-CM

## 2020-06-13 ENCOUNTER — HOSPITAL ENCOUNTER (INPATIENT)
Dept: HOSPITAL 83 - ED | Age: 70
LOS: 3 days | Discharge: HOME | DRG: 93 | End: 2020-06-16
Attending: INTERNAL MEDICINE | Admitting: INTERNAL MEDICINE
Payer: MEDICARE

## 2020-06-13 VITALS — DIASTOLIC BLOOD PRESSURE: 76 MMHG

## 2020-06-13 VITALS — SYSTOLIC BLOOD PRESSURE: 140 MMHG | DIASTOLIC BLOOD PRESSURE: 82 MMHG

## 2020-06-13 VITALS — WEIGHT: 243.38 LBS | HEIGHT: 70 IN | BODY MASS INDEX: 34.84 KG/M2

## 2020-06-13 VITALS — DIASTOLIC BLOOD PRESSURE: 87 MMHG

## 2020-06-13 DIAGNOSIS — Z87.891: ICD-10-CM

## 2020-06-13 DIAGNOSIS — Z93.1: ICD-10-CM

## 2020-06-13 DIAGNOSIS — Z79.899: ICD-10-CM

## 2020-06-13 DIAGNOSIS — E55.9: ICD-10-CM

## 2020-06-13 DIAGNOSIS — I11.9: ICD-10-CM

## 2020-06-13 DIAGNOSIS — Z88.0: ICD-10-CM

## 2020-06-13 DIAGNOSIS — R29.90: Primary | ICD-10-CM

## 2020-06-13 DIAGNOSIS — I69.30: ICD-10-CM

## 2020-06-13 DIAGNOSIS — R13.19: ICD-10-CM

## 2020-06-13 LAB
ALBUMIN SERPL-MCNC: 3.6 GM/DL (ref 3.1–4.5)
ALP SERPL-CCNC: 61 U/L (ref 45–117)
ALT SERPL W P-5'-P-CCNC: 35 U/L (ref 12–78)
APTT PPP: 28.7 SECONDS (ref 20–32.1)
AST SERPL-CCNC: 36 IU/L (ref 3–35)
BASOPHILS # BLD AUTO: 0 10*3/UL (ref 0–0.1)
BASOPHILS NFR BLD AUTO: 0.7 % (ref 0–1)
BUN SERPL-MCNC: 20 MG/DL (ref 7–24)
CHLORIDE SERPL-SCNC: 113 MMOL/L (ref 98–107)
CREAT SERPL-MCNC: 1.09 MG/DL (ref 0.7–1.3)
EOSINOPHIL # BLD AUTO: 0.3 10*3/UL (ref 0–0.4)
EOSINOPHIL # BLD AUTO: 5 % (ref 1–4)
ERYTHROCYTE [DISTWIDTH] IN BLOOD BY AUTOMATED COUNT: 14.6 % (ref 0–14.5)
HCT VFR BLD AUTO: 43.3 % (ref 42–52)
INR BLD: 1 (ref 2–3.5)
LYMPHOCYTES # BLD AUTO: 2 10*3/UL (ref 1.3–4.4)
LYMPHOCYTES NFR BLD AUTO: 35.5 % (ref 27–41)
MCH RBC QN AUTO: 29.5 PG (ref 27–31)
MCHC RBC AUTO-ENTMCNC: 31.6 G/DL (ref 33–37)
MCV RBC AUTO: 93.3 FL (ref 80–94)
MONOCYTES # BLD AUTO: 0.6 10*3/UL (ref 0.1–1)
MONOCYTES NFR BLD MANUAL: 9.9 % (ref 3–9)
NEUT #: 2.8 10*3/UL (ref 2.3–7.9)
NEUT %: 48.7 % (ref 47–73)
NRBC BLD QL AUTO: 0 10*3/UL (ref 0–0)
PLATELET # BLD AUTO: 141 10*3/UL (ref 130–400)
PMV BLD AUTO: 10.3 FL (ref 9.6–12.3)
POTASSIUM SERPL-SCNC: 3.8 MMOL/L (ref 3.5–5.1)
PROT SERPL-MCNC: 7.6 GM/DL (ref 6.4–8.2)
RBC # BLD AUTO: 4.64 10*6/UL (ref 4.5–5.9)
SODIUM SERPL-SCNC: 142 MMOL/L (ref 136–145)
WBC NRBC COR # BLD AUTO: 5.8 10*3/UL (ref 4.8–10.8)

## 2020-06-13 NOTE — NUR
PT STATES HE IS FEELING BETTER HE IS AWAKE ALERT AND ORIENTED  MOVING ALL
EXTREMITIES . FAMILY AT BEDSIDE. ELEANOR MORALES RN.

## 2020-06-13 NOTE — NUR
A 69, admitted to 5E, under the
services of MAHESH Gomez MD with a diagnosis of STROKE-LIKE SYMPTOMS.
Chief complaint is DIZZINESS.
Patient arrived via stretcher from ER.
Monitor applied. Initial assessment completed.
Vital signs taken and recorded.
MAHESH GOMEZ MD notified of admission to the unit.
Orders received.
See assessment for past medical history, medications
and allergies.
Patient and/or family oriented to unit.
Clothing/patient valuable form completed.
 
JENN FAIR

## 2020-06-14 VITALS — SYSTOLIC BLOOD PRESSURE: 137 MMHG | DIASTOLIC BLOOD PRESSURE: 70 MMHG

## 2020-06-14 VITALS — DIASTOLIC BLOOD PRESSURE: 45 MMHG

## 2020-06-14 VITALS — DIASTOLIC BLOOD PRESSURE: 76 MMHG | SYSTOLIC BLOOD PRESSURE: 154 MMHG

## 2020-06-14 VITALS — DIASTOLIC BLOOD PRESSURE: 70 MMHG

## 2020-06-14 VITALS — DIASTOLIC BLOOD PRESSURE: 81 MMHG

## 2020-06-14 NOTE — NUR
PT RESTING ON LT SIDE; NO S/S DISTRESS ON ROOM AIR, REG RESPIRATIONS. CALL
LIGHT IN REACH. BED ALARM FOR SAFETY.

## 2020-06-14 NOTE — NUR
PT SITTING UP IN RECLINER CHAIR. NO DISTRESS NOTED. PT CURRENTLY ALERT AND
ORIENTED X3. FLAT AFFECT. SLOW TO RESPOND AT TIMES. SPEECH CLEAR. PT DENIES
ANY DIZZINESS. PT DENIES ANY PAIN/DISCOMFORT. BILATERAL EQUAL . WILL
CONTINUE TO MONITOR. CALL LIGHT WITHIN REACH. VSS. SEE SHIFT ASSESSMENT.

## 2020-06-14 NOTE — NUR
PATIENT UP IN CHAIR. NO DISTRESS NOTED. NO VOICED COMPLAINTS. WILL CONTINUE TO
MONITOR. CALL LIGHT WITHIN REACH.

## 2020-06-15 VITALS — SYSTOLIC BLOOD PRESSURE: 146 MMHG | DIASTOLIC BLOOD PRESSURE: 77 MMHG

## 2020-06-15 VITALS — DIASTOLIC BLOOD PRESSURE: 70 MMHG | SYSTOLIC BLOOD PRESSURE: 137 MMHG

## 2020-06-15 VITALS — DIASTOLIC BLOOD PRESSURE: 76 MMHG | SYSTOLIC BLOOD PRESSURE: 139 MMHG

## 2020-06-15 VITALS — SYSTOLIC BLOOD PRESSURE: 113 MMHG | DIASTOLIC BLOOD PRESSURE: 68 MMHG

## 2020-06-15 VITALS — DIASTOLIC BLOOD PRESSURE: 47 MMHG

## 2020-06-15 VITALS — DIASTOLIC BLOOD PRESSURE: 72 MMHG

## 2020-06-15 LAB
BASOPHILS # BLD AUTO: 0.1 10*3/UL (ref 0–0.1)
BASOPHILS NFR BLD AUTO: 0.8 % (ref 0–1)
BUN SERPL-MCNC: 20 MG/DL (ref 7–24)
CHLORIDE SERPL-SCNC: 107 MMOL/L (ref 98–107)
CREAT SERPL-MCNC: 1.03 MG/DL (ref 0.7–1.3)
EOSINOPHIL # BLD AUTO: 0.4 10*3/UL (ref 0–0.4)
EOSINOPHIL # BLD AUTO: 5 % (ref 1–4)
ERYTHROCYTE [DISTWIDTH] IN BLOOD BY AUTOMATED COUNT: 14.3 % (ref 0–14.5)
HCT VFR BLD AUTO: 44.7 % (ref 42–52)
LYMPHOCYTES # BLD AUTO: 2.5 10*3/UL (ref 1.3–4.4)
LYMPHOCYTES NFR BLD AUTO: 35 % (ref 27–41)
MCH RBC QN AUTO: 29.2 PG (ref 27–31)
MCHC RBC AUTO-ENTMCNC: 31.1 G/DL (ref 33–37)
MCV RBC AUTO: 93.9 FL (ref 80–94)
MONOCYTES # BLD AUTO: 0.9 10*3/UL (ref 0.1–1)
MONOCYTES NFR BLD MANUAL: 11.9 % (ref 3–9)
NEUT #: 3.4 10*3/UL (ref 2.3–7.9)
NEUT %: 47 % (ref 47–73)
NRBC BLD QL AUTO: 0 % (ref 0–0)
PLATELET # BLD AUTO: 140 10*3/UL (ref 130–400)
PMV BLD AUTO: 10.6 FL (ref 9.6–12.3)
POTASSIUM SERPL-SCNC: 3.8 MMOL/L (ref 3.5–5.1)
RBC # BLD AUTO: 4.76 10*6/UL (ref 4.5–5.9)
SODIUM SERPL-SCNC: 140 MMOL/L (ref 136–145)
WBC NRBC COR # BLD AUTO: 7.3 10*3/UL (ref 4.8–10.8)

## 2020-06-15 NOTE — NUR
in to see patient. He is currently having an echo completed at
bedside. Will follow up at a later time.

## 2020-06-15 NOTE — NUR
in to talk to patient.
Patient states lives at home with his sister.
There are 16 steps in the home.
Physician: Dr. Gordy Soto
Pharmacy: [facility]
Home health services: none
Patient's level of ADLs: INDEPENDENT
Patient has working utilities: yes
DME: none
Follow-up physician's appointment after d/c: he prefers to make his own follow
up appt after discharge
Does patient want to access PORTAL?: no
Discharge plan discussed with patient. He lives at home with his sister. He
states he is independent in his ADLs and ambulation. Discussed home health care
services and he denies any home needs at this time. When medically stable he
will be discharged to home. He states his sister will provide transportation
on discharge.
 
PEDRO LUIS SHABAZZ

## 2020-06-15 NOTE — NUR
MRI PAPER REVIEWED WITH PATIENT. PT SIGNED FORM. PT IS ALERT & ORIENTED X3.
SPEECH SLOW-WITH OCCASIONAL STUTTER. DENIES ANY HX OF SEIZURES, BUT IS ON
KEPPRA. NO HISTORY OF SEIZURES NOTED IN H&P.

## 2020-06-16 VITALS — DIASTOLIC BLOOD PRESSURE: 60 MMHG

## 2020-06-16 VITALS — DIASTOLIC BLOOD PRESSURE: 57 MMHG

## 2020-06-16 NOTE — NUR
Discharge instructions reviewed with patient/family. Patient receptive and
verbalizes understanding. Follow-up care arranged. Written instructions given
to patient/family.
SAKINA DIAZ

## 2022-12-22 ENCOUNTER — HOSPITAL ENCOUNTER (EMERGENCY)
Dept: HOSPITAL 83 - ED | Age: 72
Discharge: ANOTHER HOSPITAL | End: 2022-12-22
Payer: MEDICARE

## 2022-12-22 VITALS — DIASTOLIC BLOOD PRESSURE: 76 MMHG | SYSTOLIC BLOOD PRESSURE: 142 MMHG

## 2022-12-22 VITALS — WEIGHT: 280.38 LBS | HEIGHT: 60 IN

## 2022-12-22 DIAGNOSIS — F10.10: ICD-10-CM

## 2022-12-22 DIAGNOSIS — Z87.891: ICD-10-CM

## 2022-12-22 DIAGNOSIS — I62.9: Primary | ICD-10-CM

## 2022-12-22 DIAGNOSIS — Z90.89: ICD-10-CM

## 2022-12-22 DIAGNOSIS — Z88.0: ICD-10-CM

## 2022-12-22 LAB
ALP SERPL-CCNC: 73 U/L (ref 46–116)
ALT SERPL W P-5'-P-CCNC: 24 U/L (ref 10–49)
APTT PPP: 30.8 SECONDS (ref 20–32.1)
BASOPHILS # BLD AUTO: 0 10*3/UL (ref 0–0.1)
BASOPHILS NFR BLD AUTO: 0.6 % (ref 0–1)
BUN SERPL-MCNC: 10 MG/DL (ref 9–23)
CHLORIDE SERPL-SCNC: 107 MMOL/L (ref 98–107)
CREAT SERPL-MCNC: 0.76 MG/DL (ref 0.7–1.3)
EOSINOPHIL # BLD AUTO: 0.2 10*3/UL (ref 0–0.4)
EOSINOPHIL # BLD AUTO: 4.5 % (ref 1–4)
ERYTHROCYTE [DISTWIDTH] IN BLOOD BY AUTOMATED COUNT: 14.6 % (ref 0–14.5)
HCT VFR BLD AUTO: 46.6 % (ref 42–52)
INR BLD: 1.1 (ref 2–3.5)
LIPASE SERPL-CCNC: 39 U/L (ref 12–53)
LYMPHOCYTES # BLD AUTO: 2 10*3/UL (ref 1.3–4.4)
LYMPHOCYTES NFR BLD AUTO: 39.1 % (ref 27–41)
MCH RBC QN AUTO: 29.6 PG (ref 27–31)
MCHC RBC AUTO-ENTMCNC: 32.2 G/DL (ref 33–37)
MCV RBC AUTO: 92.1 FL (ref 80–94)
MONOCYTES # BLD AUTO: 0.4 10*3/UL (ref 0.1–1)
MONOCYTES NFR BLD MANUAL: 8.4 % (ref 3–9)
NEUT #: 2.4 10*3/UL (ref 2.3–7.9)
NEUT %: 47.2 % (ref 47–73)
NRBC BLD QL AUTO: 0 % (ref 0–0)
PLATELET # BLD AUTO: 106 10*3/UL (ref 130–400)
PMV BLD AUTO: 10.4 FL (ref 9.6–12.3)
POTASSIUM SERPL-SCNC: 3.6 MMOL/L (ref 3.4–5.1)
PROT SERPL-MCNC: 7.6 GM/DL (ref 6–8)
RBC # BLD AUTO: 5.06 10*6/UL (ref 4.5–5.9)
SODIUM SERPL-SCNC: 142 MMOL/L (ref 136–145)
WBC NRBC COR # BLD AUTO: 5.1 10*3/UL (ref 4.8–10.8)

## 2024-09-20 ENCOUNTER — HOSPITAL ENCOUNTER (OUTPATIENT)
Dept: HOSPITAL 83 - LAB | Age: 74
Discharge: HOME | End: 2024-09-20
Attending: INTERNAL MEDICINE
Payer: MEDICARE

## 2024-09-20 DIAGNOSIS — E55.9: ICD-10-CM

## 2024-09-20 DIAGNOSIS — I10: Primary | ICD-10-CM

## 2024-09-20 LAB
25(OH)D3 SERPL-MCNC: 29.7 NG/ML (ref 30–100)
ALP SERPL-CCNC: 66 U/L (ref 46–116)
ALT SERPL W P-5'-P-CCNC: 19 U/L (ref 5–49)
BASOPHILS # BLD AUTO: 0.1 10*3/UL (ref 0–0.1)
BASOPHILS NFR BLD AUTO: 1.2 % (ref 0–1)
BUN SERPL-MCNC: 16 MG/DL (ref 9–23)
CHLORIDE SERPL-SCNC: 109 MMOL/L (ref 98–107)
CHOLEST SERPL-MCNC: 162 MG/DL (ref ?–200)
EOSINOPHIL # BLD AUTO: 0.3 10*3/UL (ref 0–0.4)
EOSINOPHIL # BLD AUTO: 4.8 % (ref 1–4)
ERYTHROCYTE [DISTWIDTH] IN BLOOD BY AUTOMATED COUNT: 14.5 % (ref 0–14.5)
HCT VFR BLD AUTO: 44.1 % (ref 42–52)
LDLC SERPL DIRECT ASSAY-MCNC: 94 MG/DL (ref 9–159)
LYMPHOCYTES # BLD AUTO: 2 10*3/UL (ref 1.3–4.4)
LYMPHOCYTES NFR BLD AUTO: 33.6 % (ref 27–41)
MCH RBC QN AUTO: 29.9 PG (ref 27–31)
MCHC RBC AUTO-ENTMCNC: 32.2 G/DL (ref 33–37)
MCV RBC AUTO: 92.8 FL (ref 80–94)
MONOCYTES # BLD AUTO: 0.6 10*3/UL (ref 0.1–1)
MONOCYTES NFR BLD MANUAL: 10.1 % (ref 3–9)
NEUT #: 3 10*3/UL (ref 2.3–7.9)
NEUT %: 50 % (ref 47–73)
NRBC BLD QL AUTO: 0 % (ref 0–0)
PLATELET # BLD AUTO: 133 10*3/UL (ref 130–400)
PMV BLD AUTO: 9.6 FL (ref 9.6–12.3)
POTASSIUM SERPL-SCNC: 3.7 MMOL/L (ref 3.4–5.1)
PROT SERPL-MCNC: 7.2 GM/DL (ref 6–8)
RBC # BLD AUTO: 4.75 10*6/UL (ref 4.5–5.9)
T4 FREE SERPL-MCNC: 0.97 NG/DL (ref 0.89–1.76)
TRIGL SERPL-MCNC: 95 MG/DL (ref ?–150)
WBC NRBC COR # BLD AUTO: 6 10*3/UL (ref 4.8–10.8)